# Patient Record
Sex: MALE | Race: WHITE | NOT HISPANIC OR LATINO | Employment: FULL TIME | URBAN - METROPOLITAN AREA
[De-identification: names, ages, dates, MRNs, and addresses within clinical notes are randomized per-mention and may not be internally consistent; named-entity substitution may affect disease eponyms.]

---

## 2018-10-02 LAB
BUN SERPL-MCNC: 14 MG/DL (ref 7–18)
CALCIUM SERPL-MCNC: 9.3 MG/DL (ref 8.5–10.1)
CHLORIDE SERPL-SCNC: 98 MMOL/L (ref 98–107)
CHOLEST SERPL-MCNC: 184 MG/DL
CO2 SERPL-SCNC: 22 MMOL/L (ref 21–32)
CREAT SERPL-MCNC: 0.93 MG/DL (ref 0.6–1.3)
GLUCOSE SERPL-MCNC: 101 MG/DL (ref 74–106)
HDLC SERPL-MCNC: 45 MG/DL (ref 35–60)
LDLC SERPL CALC-MCNC: 33 MG/DL
POTASSIUM SERPL-SCNC: 4.3 MMOL/L (ref 3.5–5.1)
SODIUM SERPL-SCNC: 138 MEQ/L (ref 131–145)
TRIGL SERPL-MCNC: 163 MG/DL (ref 30–150)

## 2018-12-06 ENCOUNTER — HISTORICAL (OUTPATIENT)
Dept: ADMINISTRATIVE | Facility: HOSPITAL | Age: 48
End: 2018-12-06

## 2018-12-06 LAB
ABS NEUT (OLG): 6 X10(3)/MCL (ref 2.1–9.2)
ALBUMIN SERPL-MCNC: 4.9 GM/DL (ref 3.4–5)
ALBUMIN/GLOB SERPL: 1.69 {RATIO} (ref 1.5–2.5)
ALP SERPL-CCNC: 57 UNIT/L (ref 38–126)
ALT SERPL-CCNC: 41 UNIT/L (ref 7–52)
AST SERPL-CCNC: 30 UNIT/L (ref 15–37)
BILIRUB SERPL-MCNC: 1 MG/DL (ref 0.2–1)
BILIRUBIN DIRECT+TOT PNL SERPL-MCNC: 0.2 MG/DL (ref 0–0.5)
BILIRUBIN DIRECT+TOT PNL SERPL-MCNC: 0.8 MG/DL
BUN SERPL-MCNC: 11 MG/DL (ref 7–18)
CALCIUM SERPL-MCNC: 9.4 MG/DL (ref 8.5–10)
CHLORIDE SERPL-SCNC: 102 MMOL/L (ref 98–107)
CHOLEST SERPL-MCNC: 194 MG/DL (ref 0–200)
CHOLEST/HDLC SERPL: 5.1 {RATIO}
CO2 SERPL-SCNC: 29 MMOL/L (ref 21–32)
CREAT SERPL-MCNC: 0.9 MG/DL (ref 0.6–1.3)
ERYTHROCYTE [DISTWIDTH] IN BLOOD BY AUTOMATED COUNT: 13 % (ref 11.5–17)
GLOBULIN SER-MCNC: 2.9 GM/DL (ref 1.2–3)
GLUCOSE SERPL-MCNC: 111 MG/DL (ref 74–106)
HCT VFR BLD AUTO: 58 % (ref 42–52)
HDLC SERPL-MCNC: 38 MG/DL (ref 35–60)
HGB BLD-MCNC: 18.5 GM/DL (ref 14–18)
LDLC SERPL CALC-MCNC: 78 MG/DL (ref 0–129)
LYMPHOCYTES # BLD AUTO: 1.5 X10(3)/MCL (ref 0.6–3.4)
LYMPHOCYTES NFR BLD AUTO: 17.7 % (ref 13–40)
MCH RBC QN AUTO: 30.2 PG (ref 27–31.2)
MCHC RBC AUTO-ENTMCNC: 32 GM/DL (ref 32–36)
MCV RBC AUTO: 95 FL (ref 80–94)
MONOCYTES # BLD AUTO: 1 X10(3)/MCL (ref 0.1–1.3)
MONOCYTES NFR BLD AUTO: 12.2 % (ref 0.1–24)
NEUTROPHILS NFR BLD AUTO: 70.1 % (ref 47–80)
PLATELET # BLD AUTO: 215 X10(3)/MCL (ref 130–400)
PMV BLD AUTO: 9.8 FL (ref 9.4–12.4)
POTASSIUM SERPL-SCNC: 4.3 MMOL/L (ref 3.5–5.1)
PROT SERPL-MCNC: 7.8 GM/DL (ref 6.4–8.2)
PSA SERPL-MCNC: 0.69 NG/ML (ref 0–2.5)
RBC # BLD AUTO: 6.12 X10(6)/MCL (ref 4.7–6.1)
SODIUM SERPL-SCNC: 137 MMOL/L (ref 136–145)
TESTOST SERPL-MCNC: >1009 NG/DL (ref 300–1060)
TRIGL SERPL-MCNC: 305 MG/DL (ref 30–150)
VLDLC SERPL CALC-MCNC: 61 MG/DL
WBC # SPEC AUTO: 8.5 X10(3)/MCL (ref 4.5–11.5)

## 2018-12-07 ENCOUNTER — HISTORICAL (OUTPATIENT)
Dept: LAB | Facility: HOSPITAL | Age: 48
End: 2018-12-07

## 2019-06-05 LAB
ABS NEUT (OLG): 3.8 X10(3)/MCL (ref 2.1–9.2)
ERYTHROCYTE [DISTWIDTH] IN BLOOD BY AUTOMATED COUNT: 12.3 % (ref 11.5–17)
HCT VFR BLD AUTO: 55.7 % (ref 42–52)
HGB BLD-MCNC: 19.1 GM/DL (ref 14–18)
LYMPHOCYTES # BLD AUTO: 1.8 X10(3)/MCL (ref 0.6–3.4)
LYMPHOCYTES NFR BLD AUTO: 27.8 % (ref 13–40)
MCH RBC QN AUTO: 30.8 PG (ref 27–31.2)
MCHC RBC AUTO-ENTMCNC: 34 GM/DL (ref 32–36)
MCV RBC AUTO: 90 FL (ref 80–94)
MONOCYTES # BLD AUTO: 0.7 X10(3)/MCL (ref 0.1–1.3)
MONOCYTES NFR BLD AUTO: 10.5 % (ref 0.1–24)
NEUTROPHILS NFR BLD AUTO: 61.7 % (ref 47–80)
PLATELET # BLD AUTO: 240 X10(3)/MCL (ref 130–400)
PMV BLD AUTO: 10 FL (ref 9.4–12.4)
RBC # BLD AUTO: 6.2 X10(6)/MCL (ref 4.7–6.1)
TESTOST SERPL-MCNC: 453 NG/DL (ref 300–1060)
WBC # SPEC AUTO: 6.3 X10(3)/MCL (ref 4.5–11.5)

## 2019-06-11 ENCOUNTER — HISTORICAL (OUTPATIENT)
Dept: ADMINISTRATIVE | Facility: HOSPITAL | Age: 49
End: 2019-06-11

## 2019-06-11 LAB
ALBUMIN SERPL-MCNC: 4.5 GM/DL (ref 3.4–5)
ALBUMIN/GLOB SERPL: 1.73 {RATIO} (ref 1.5–2.5)
ALP SERPL-CCNC: 65 UNIT/L (ref 38–126)
ALT SERPL-CCNC: 32 UNIT/L (ref 7–52)
AST SERPL-CCNC: 34 UNIT/L (ref 15–37)
BILIRUB SERPL-MCNC: 0.7 MG/DL (ref 0.2–1)
BILIRUBIN DIRECT+TOT PNL SERPL-MCNC: 0.1 MG/DL (ref 0–0.5)
BILIRUBIN DIRECT+TOT PNL SERPL-MCNC: 0.6 MG/DL
BUN SERPL-MCNC: 13 MG/DL (ref 7–18)
CALCIUM SERPL-MCNC: 9.3 MG/DL (ref 8.5–10)
CHLORIDE SERPL-SCNC: 103 MMOL/L (ref 98–107)
CHOLEST SERPL-MCNC: 229 MG/DL (ref 0–200)
CHOLEST/HDLC SERPL: 5.5 {RATIO}
CO2 SERPL-SCNC: 21 MMOL/L (ref 21–32)
CREAT SERPL-MCNC: 1 MG/DL (ref 0.6–1.3)
GLOBULIN SER-MCNC: 2.6 GM/DL (ref 1.2–3)
GLUCOSE SERPL-MCNC: 105 MG/DL (ref 74–106)
HDLC SERPL-MCNC: 42 MG/DL (ref 35–60)
LDLC SERPL CALC-MCNC: 73 MG/DL (ref 0–129)
POTASSIUM SERPL-SCNC: 4.6 MMOL/L (ref 3.5–5.1)
PROT SERPL-MCNC: 7.1 GM/DL (ref 6.4–8.2)
SODIUM SERPL-SCNC: 138 MMOL/L (ref 136–145)
TRIGL SERPL-MCNC: 658 MG/DL (ref 30–150)
VLDLC SERPL CALC-MCNC: 131.6 MG/DL

## 2022-04-07 ENCOUNTER — HISTORICAL (OUTPATIENT)
Dept: ADMINISTRATIVE | Facility: HOSPITAL | Age: 52
End: 2022-04-07

## 2022-04-23 VITALS
SYSTOLIC BLOOD PRESSURE: 122 MMHG | BODY MASS INDEX: 33.98 KG/M2 | DIASTOLIC BLOOD PRESSURE: 72 MMHG | WEIGHT: 224.19 LBS | HEIGHT: 68 IN

## 2022-05-01 NOTE — HISTORICAL [facility] CERNER
This is a historical note converted from Inna. Formatting and pictures may have been removed.  Please reference Inna for original formatting and attached multimedia. Chief Complaint  wellness  History of Present Illness  having allergy flareup lately, taking allegra; some mild CCC and wants celestone  hasnt been exercising lately  had esophageal dilation in january with dr barcenas, diagnosed and treated for H pylori, needs stool for antigen follow up  saw podiatrist - didnt have much help  Review of Systems  Comprehensive Review of Systems performed with no exceptions for new complaints other than as noted in HPI.  Physical Exam  Vitals & Measurements  HR:?80(Peripheral)? BP:?122/78?  HT:?173?cm? HT:?173?cm? WT:?96.6?kg? WT:?96.6?kg? BMI:?32.28?  ?  PHYSICAL EXAM  ?   WDWN patient in NAD, ?AFVSS  HEENT - no acute abnormality  ??????????????? oropharynx mild OP erythema/cobblestoning  HEART - RRR  LUNGS -? CTA  ABDOMEN - benign, NTND;? no peritoneal signs  EXTREMITIES - No CCE  SKIN - warm, dry, intact  PSYCH - affect appropriate;? alert and oriented  NEURO- no new deficits noted;? cranial nerves grossly intact  ?  Assessment/Plan  1.?Wellness examination?Z00.00  ?doing well  2.?HLD (hyperlipidemia)?E78.5  ?will recheck  3.?Low testosterone?R79.89  ?recheck labs  4.?Allergic rhinitis?J30.9  ?hed like a celestone injection  5.?Influenza vaccine refused?Z28.21  6.?H. pylori infection?A04.8  will check stool antigen   Problem List/Past Medical History  Ongoing  Allergic rhinitis  Asthma in child  Family history of congenital sensorineural hearing loss (SNHL)  History of Helicobacter pylori infection  HLD (hyperlipidemia)  Hypertriglyceridemia  Influenza vaccine refused  Insomnia  Low testosterone  Wellness examination  Historical  No qualifying data  Procedure/Surgical History  Esophagogastroduodenoscopy (01/29/2018)  Vasectomy   Medications  Astelin 137 mcg/inh nasal spray, 2 spray(s), Nasal, BID, 1  refills  rosuvastatin 20 mg oral tablet, See Instructions, 1 refills  testosterone 30 mg/1.5 mL transdermal solution  zolpidem 12.5 mg oral tablet, extended release, See Instructions, 4 refills  Allergies  No Known Medication Allergies  Social History  Alcohol  Never, 12/06/2018  Employment/School  Employed, 12/06/2018  Home/Environment  Lives with Alone. Living situation: Home/Independent., 12/06/2018  Substance Abuse  Never, 12/06/2018  Tobacco  Never smoker, N/A, 12/06/2018  Family History  CVA - Cerebrovascular accident: Father.  Diabetes mellitus type 2: Father.  Hypertension.: Father.  Psychiatric: Sister.  Immunizations  Vaccine Date Status   tetanus/diphth/pertuss (Tdap) adult/adol 02/09/2009 Recorded   Health Maintenance  Health Maintenance  ???Pending?(in the next year)  ??? ??OverDue  ??? ? ? ?Diabetes Screening due??and every?  ??? ??Due?  ??? ? ? ?ADL Screening due??12/08/18??and every 1??year(s)  ??? ? ? ?Alcohol Misuse Screening due??12/08/18??and every 1??year(s)  ??? ? ? ?Aspirin Therapy for CVD Prevention due??12/08/18??and every 1??year(s)  ??? ? ? ?Asthma Management-Asthma Education due??12/08/18??and every 6??month(s)  ??? ? ? ?Asthma Management-Spirometry due??12/08/18??Variable frequency  ??? ? ? ?Asthma Management-[place] Flow due??12/08/18??Variable frequency  ??? ? ? ?Asthma Management-Written Action Plan due??12/08/18??and every 6??month(s)  ??? ? ? ?Depression Screening due??12/08/18??and every?  ??? ? ? ?Influenza Vaccine due??12/08/18??and every?  ??? ? ? ?Smoking Cessation due??12/08/18??and every 180??day(s)  ??? ??Due In Future?  ??? ? ? ?Tetanus Vaccine not due until??02/09/19??and every 10??year(s)  ??? ? ? ?Blood Pressure Screening not due until??12/06/19??and every 1??year(s)  ??? ? ? ?Body Mass Index Check not due until??12/06/19??and every 1??year(s)  ??? ? ? ?Obesity Screening not due until??12/06/19??and every 1??year(s)  ???Satisfied?(in the past 1 year)  ???  ??Satisfied?  ??? ? ? ?Blood Pressure Screening on??12/06/18.??Satisfied by Nupur Fernandez LPN  ??? ? ? ?Body Mass Index Check on??12/06/18.??Satisfied by Nupur Fernandez LPN  ??? ? ? ?Diabetes Screening on??12/06/18.??Satisfied by Idris Stiles  ??? ? ? ?Lipid Screening on??12/06/18.??Satisfied by Idris Stiles  ??? ? ? ?Obesity Screening on??12/06/18.??Satisfied by Nupur Fernandez LPN  ?  ?

## 2022-05-30 PROBLEM — J30.9 ALLERGIC RHINITIS: Status: ACTIVE | Noted: 2022-05-30

## 2022-05-30 PROBLEM — R79.89 DECREASED TESTOSTERONE LEVEL: Status: ACTIVE | Noted: 2022-05-30

## 2022-05-30 PROBLEM — Z28.21 INFLUENZA VACCINATION DECLINED: Status: ACTIVE | Noted: 2022-05-30

## 2022-05-30 PROBLEM — D75.1 SECONDARY POLYCYTHEMIA: Status: ACTIVE | Noted: 2022-05-30

## 2022-05-30 PROBLEM — J45.909 ASTHMA: Status: ACTIVE | Noted: 2022-05-30

## 2022-05-30 PROBLEM — Z86.19 HISTORY OF HELICOBACTER PYLORI INFECTION: Status: ACTIVE | Noted: 2022-05-30

## 2022-05-30 PROBLEM — E78.1 HYPERTRIGLYCERIDEMIA: Status: ACTIVE | Noted: 2022-05-30

## 2022-05-30 PROBLEM — G47.00 INSOMNIA: Status: ACTIVE | Noted: 2022-05-30

## 2022-05-30 PROBLEM — R53.83 FATIGUE: Status: ACTIVE | Noted: 2022-05-30

## 2022-06-01 PROBLEM — Z00.00 ENCOUNTER FOR WELLNESS EXAMINATION: Status: ACTIVE | Noted: 2022-06-01

## 2022-06-01 PROBLEM — J32.9 SINUSITIS: Status: ACTIVE | Noted: 2022-06-01

## 2022-09-05 PROBLEM — Z00.00 ENCOUNTER FOR WELLNESS EXAMINATION: Status: RESOLVED | Noted: 2022-06-01 | Resolved: 2022-09-05

## 2022-12-13 PROBLEM — E78.5 HLD (HYPERLIPIDEMIA): Status: ACTIVE | Noted: 2022-05-30

## 2022-12-13 PROBLEM — J32.9 SINUSITIS: Status: RESOLVED | Noted: 2022-06-01 | Resolved: 2022-12-13

## 2023-04-27 PROBLEM — E78.5 HYPERLIPIDEMIA: Status: ACTIVE | Noted: 2023-04-27

## 2023-11-02 PROCEDURE — 84484 ASSAY OF TROPONIN QUANT: CPT | Performed by: FAMILY MEDICINE

## 2023-11-09 PROBLEM — I10 PRIMARY HYPERTENSION: Status: ACTIVE | Noted: 2023-11-09

## 2024-03-18 PROBLEM — G47.33 OSA ON CPAP: Status: ACTIVE | Noted: 2024-03-18

## 2024-03-25 ENCOUNTER — HOSPITAL ENCOUNTER (OUTPATIENT)
Dept: RADIOLOGY | Facility: HOSPITAL | Age: 54
Discharge: HOME | End: 2024-03-25
Attending: FAMILY MEDICINE
Payer: COMMERCIAL

## 2024-03-25 ENCOUNTER — CLINICAL SUPPORT (OUTPATIENT)
Dept: REHABILITATION | Facility: HOSPITAL | Age: 54
End: 2024-03-25
Attending: FAMILY MEDICINE
Payer: COMMERCIAL

## 2024-03-25 DIAGNOSIS — R13.12 OROPHARYNGEAL DYSPHAGIA: ICD-10-CM

## 2024-03-25 PROCEDURE — A9698 NON-RAD CONTRAST MATERIALNOC: HCPCS | Performed by: FAMILY MEDICINE

## 2024-03-25 PROCEDURE — 25500020 PHARM REV CODE 255: Performed by: FAMILY MEDICINE

## 2024-03-25 PROCEDURE — 92611 MOTION FLUOROSCOPY/SWALLOW: CPT

## 2024-03-25 PROCEDURE — 74230 X-RAY XM SWLNG FUNCJ C+: CPT | Mod: TC

## 2024-03-25 RX ADMIN — BARIUM SULFATE 5 ML: 400 SUSPENSION ORAL at 09:03

## 2024-03-25 NOTE — PROGRESS NOTES
"  [facility]  Speech Language Pathology Department  Outpatient Modified Barium Swallow Study    Patient Name:  Stefan Bennett   MRN:  [id number]    Recommendations     General recommendations:  no SLP intervention indicated and follow up with GI if symptoms persist  Diet texture/consistency recommendations: Regular solids (IDDSI 7) and thin liquids (IDDSI 0)  Medications: per patient preference  Swallow strategies/precautions: upright for PO intake  General precautions: aspiration    History/Reason for Referral     Stefan Bennett is a/n 53 y.o. male referred by PCP for a Modified Barium Swallow Study due to complaints of possible throat spasms resulting in food getting stuck.    Past medical history includes EGD with dilation.  Reports he feels "It may be time for another one."    Home diet texture/consistency: Regular and thin liquids  Current Method of Nutrition: PO diet (regular and thin liquids)    Patient complaint: to eat/drink without choking    Subjective     Patient awake, alert, calm, and cooperative.  Spiritual/Cultural/Yarsani Beliefs/Practices that affect care: no    Pain/Comfort: no  Respiratory Status: room air    Restraints/positioning devices: none      Fluoroscopic Findings     Oral Musculature  Dentition: own teeth  Secretion Management: adequate  Mucosal Quality: good  Facial Movement: WFL  Buccal Strength & Mobility: WFL  Mandibular Strength & Mobility: WFL  Oral Labial Strength & Mobility: WFL  Lingual Strength & Mobility: WFL  Velar Elevation: WFL  Vocal Quality: adequate    Positioning: seated in straight chair  Visualization  Patient was seen in the lateral view    Oral Phase:   Reduced bolus control with prespill to the valleculae    Pharyngeal Phase:   Swallow delay with spill to the valleculae  Consistency Laryngeal Penetration Aspiration Residue   Thin liquid by cup None None None   Puree None None None   Chewable solid None None None   Thin liquid by straw None " None None       Cervical Esophageal Phase:   UES appeared to accommodate all bolus types without stasis or retrograde movement visualized    Assessment     Oropharyngeal swallow WFL with no laryngeal penetration or aspiration visualized during this study.     Patient appears to be at low risk for aspiration related pneumonia when considering  hx of requiring EGD with dilation .     Patient Education     Patient provided with verbal education regarding results.  Understanding was verbalized.    Time Tracking     SLP Treatment Date:  03/25/24  Speech Start Time:  0900  Speech Stop Time:  0915     Speech Total Time (min):  15 min    Billable minutes:   Motion Fluoroscopic Evaluation, Video Recording, 15 minutes     03/25/2024